# Patient Record
Sex: MALE | Race: WHITE | NOT HISPANIC OR LATINO | URBAN - METROPOLITAN AREA
[De-identification: names, ages, dates, MRNs, and addresses within clinical notes are randomized per-mention and may not be internally consistent; named-entity substitution may affect disease eponyms.]

---

## 2021-09-24 PROBLEM — M25.511 ACUTE PAIN OF RIGHT SHOULDER DUE TO TRAUMA: Status: ACTIVE | Noted: 2021-09-24

## 2021-09-24 PROBLEM — G89.11 ACUTE PAIN OF RIGHT SHOULDER DUE TO TRAUMA: Status: ACTIVE | Noted: 2021-09-24

## 2022-04-21 ENCOUNTER — WALK IN (OUTPATIENT)
Dept: URGENT CARE | Age: 27
End: 2022-04-21

## 2022-04-21 VITALS
SYSTOLIC BLOOD PRESSURE: 118 MMHG | RESPIRATION RATE: 18 BRPM | OXYGEN SATURATION: 98 % | WEIGHT: 159.17 LBS | TEMPERATURE: 98.9 F | DIASTOLIC BLOOD PRESSURE: 76 MMHG | BODY MASS INDEX: 24.12 KG/M2 | HEART RATE: 76 BPM | HEIGHT: 68 IN

## 2022-04-21 DIAGNOSIS — R11.2 NAUSEA AND VOMITING IN ADULT: ICD-10-CM

## 2022-04-21 LAB
INTERNAL PROCEDURAL CONTROLS ACCEPTABLE: YES
SARS-COV+SARS-COV-2 AG RESP QL IA.RAPID: NOT DETECTED

## 2022-04-21 PROCEDURE — 87426 SARSCOV CORONAVIRUS AG IA: CPT | Performed by: NURSE PRACTITIONER

## 2022-04-21 PROCEDURE — 99203 OFFICE O/P NEW LOW 30 MIN: CPT | Performed by: NURSE PRACTITIONER

## 2022-04-21 ASSESSMENT — ENCOUNTER SYMPTOMS
VOMITING: 1
DIAPHORESIS: 0
VISUAL CHANGE: 0
COUGH: 0
FATIGUE: 0
ANOREXIA: 0
ABDOMINAL PAIN: 0
SORE THROAT: 0
CHILLS: 0
HEADACHES: 0
VERTIGO: 0
NAUSEA: 1
WEAKNESS: 0
FEVER: 0
NUMBNESS: 0
CHANGE IN BOWEL HABIT: 0
SWOLLEN GLANDS: 0

## 2022-04-21 ASSESSMENT — PAIN SCALES - GENERAL: PAINLEVEL: 0

## 2024-11-24 ENCOUNTER — HOSPITAL ENCOUNTER (EMERGENCY)
Facility: HOSPITAL | Age: 29
Discharge: HOME | End: 2024-11-24
Attending: EMERGENCY MEDICINE
Payer: OTHER MISCELLANEOUS

## 2024-11-24 VITALS
WEIGHT: 180 LBS | HEART RATE: 55 BPM | DIASTOLIC BLOOD PRESSURE: 74 MMHG | BODY MASS INDEX: 27.28 KG/M2 | HEIGHT: 68 IN | SYSTOLIC BLOOD PRESSURE: 128 MMHG | RESPIRATION RATE: 16 BRPM | TEMPERATURE: 99 F | OXYGEN SATURATION: 99 %

## 2024-11-24 DIAGNOSIS — S61.011A LACERATION OF RIGHT THUMB WITHOUT FOREIGN BODY WITHOUT DAMAGE TO NAIL, INITIAL ENCOUNTER: Primary | ICD-10-CM

## 2024-11-24 PROCEDURE — 12001 RPR S/N/AX/GEN/TRNK 2.5CM/<: CPT

## 2024-11-24 PROCEDURE — 99283 EMERGENCY DEPT VISIT LOW MDM: CPT

## 2024-11-25 NOTE — ED INITIAL ASSESSMENT (HPI)
Pt ambulated to er with lac at right thumb. States cut with  at 1830 tonight--bld controlled with bandage  Last tetanus 8yrs ago  ER chg RN viewed Corporate contracts list - [facility] not listed for additional tests.

## 2024-11-25 NOTE — DISCHARGE INSTRUCTIONS
Keep area clean and dry at home  Return to the ER if symptoms worsen or if any other problems arise

## 2024-11-25 NOTE — ED PROVIDER NOTES
Patient Seen in: [facility] Emergency Department      History     Chief Complaint   Patient presents with    Laceration/Abrasion     Stated Complaint: lac to thumb while at work at [employer]. states employer faxed over paperwork    Subjective:   HPI      Patient is a 28-year-old ambidextrous male presents emergency room with a history of cutting his right thumb with a  at work at about 630 this evening.  Patient's last tetanus was 8 years ago per patient.  The patient denies history of any other complaints of discomfort except at the right thumb at this time.  Patient denies history of any excessive bleeding.    Objective:     No pertinent past medical history.            No pertinent past surgical history.              No pertinent social history.                Physical Exam     ED Triage Vitals [11/24/24 1949]   /74   Pulse 78   Resp 16   Temp 98.7 °F (37.1 °C)   Temp src Oral   SpO2 100 %   O2 Device None (Room air)       Current Vitals:   Vital Signs  BP: 128/74  Pulse: 57  Resp: 16  Temp: 98.7 °F (37.1 °C)  Temp src: Oral  MAP (mmHg): 90    Oxygen Therapy  SpO2: 97 %  O2 Device: None (Room air)        Physical Exam  GENERAL: Well-developed, well-nourished male sitting up breathing easily in no apparent distress.  Patient is nontoxic in appearance.  EXTREMITIES: There is no cyanosis, clubbing, or edema appreciated. Pulses are 2+ in the right upper extremity at this time.  There is a superficial nongaping laceration noted to the distal tip of the right thumb measuring approximately 1 cm in length with no evidence of any active bleeding no evidence of any foreign body noted.  There is good cap refill in the affected digit.  There is no focal bony tenderness throughout the right hand appreciated.    NEURO: Patient is awake, alert and oriented to time place and person. Motor strength is 5 over 5 in all digits of the right hand regard to flexion, extension, abduction, and adduction.  There are  no sensory deficits appreciated in the right thumb.        ED Course   Labs Reviewed - No data to display         Anesthesia was offered to the patient which she declined the patient's laceration was       MDM      Irrigated copiously with normal saline and was well-approximated with Dermabond here in the emergency room.  Sterile dressing was then applied.  The patient will need follow-up with employee health and instructions to return to the ER immediately if symptoms worsen or if any other problems arise.  Patient discharged to home at this time.  Patient instructed to keep the area clean and dry and follow-up with employee health.        Medical Decision Making      Disposition and Plan     Clinical Impression:  1. Laceration of right thumb without foreign body without damage to nail, initial encounter         Disposition:  Discharge  11/24/2024  8:23 pm    Follow-up:  [facility]  [address] [place] [address] Dr Santacruz [address] [place] [id number] [phone]  Call in 1 day  Work related injury follow up          Medications Prescribed:  There are no discharge medications for this patient.          Supplementary Documentation: